# Patient Record
Sex: FEMALE | Race: WHITE | NOT HISPANIC OR LATINO | Employment: UNEMPLOYED | URBAN - METROPOLITAN AREA
[De-identification: names, ages, dates, MRNs, and addresses within clinical notes are randomized per-mention and may not be internally consistent; named-entity substitution may affect disease eponyms.]

---

## 2023-06-02 ENCOUNTER — TELEPHONE (OUTPATIENT)
Dept: OBSTETRICS AND GYNECOLOGY | Facility: CLINIC | Age: 23
End: 2023-06-02
Payer: COMMERCIAL

## 2023-06-02 DIAGNOSIS — Z32.00 POSSIBLE PREGNANCY, NOT CONFIRMED: Primary | ICD-10-CM

## 2023-06-02 NOTE — TELEPHONE ENCOUNTER
Patient coming in for NOB on July 7, Patient is unsure of her of last Menstrual. Needs orders to do HCG bloodwork.

## 2023-06-02 NOTE — TELEPHONE ENCOUNTER
Left patient a voicemail stating hcg order was placed. Informed pt she can come into the office any day of the week between 8:30 am and :30 pm to have this drawn without an appointment.

## 2023-06-07 ENCOUNTER — OFFICE VISIT (OUTPATIENT)
Dept: INTERNAL MEDICINE | Facility: CLINIC | Age: 23
End: 2023-06-07
Payer: COMMERCIAL

## 2023-06-07 ENCOUNTER — LAB (OUTPATIENT)
Dept: INTERNAL MEDICINE | Facility: CLINIC | Age: 23
End: 2023-06-07
Payer: COMMERCIAL

## 2023-06-07 DIAGNOSIS — Z34.90 PREGNANCY, UNSPECIFIED GESTATIONAL AGE: ICD-10-CM

## 2023-06-07 DIAGNOSIS — Z00.00 WELL ADULT EXAM: ICD-10-CM

## 2023-06-07 DIAGNOSIS — Z11.3 SCREEN FOR STD (SEXUALLY TRANSMITTED DISEASE): ICD-10-CM

## 2023-06-07 DIAGNOSIS — F39 MOOD DISORDER: Primary | ICD-10-CM

## 2023-06-07 LAB
ABO GROUP BLD: NORMAL
BACTERIA UR QL AUTO: ABNORMAL /HPF
BASOPHILS # BLD AUTO: 0.03 10*3/MM3 (ref 0–0.2)
BASOPHILS NFR BLD AUTO: 0.5 % (ref 0–1.5)
BILIRUB UR QL STRIP: NEGATIVE
CLARITY UR: ABNORMAL
COLOR UR: ABNORMAL
DEPRECATED RDW RBC AUTO: 42.4 FL (ref 37–54)
EOSINOPHIL # BLD AUTO: 0.25 10*3/MM3 (ref 0–0.4)
EOSINOPHIL NFR BLD AUTO: 4.2 % (ref 0.3–6.2)
ERYTHROCYTE [DISTWIDTH] IN BLOOD BY AUTOMATED COUNT: 14 % (ref 12.3–15.4)
GLUCOSE UR STRIP-MCNC: NEGATIVE MG/DL
HBA1C MFR BLD: 5.3 % (ref 4.8–5.6)
HCT VFR BLD AUTO: 37.6 % (ref 34–46.6)
HGB BLD-MCNC: 12.6 G/DL (ref 12–15.9)
HGB UR QL STRIP.AUTO: NEGATIVE
HYALINE CASTS UR QL AUTO: ABNORMAL /LPF
IMM GRANULOCYTES # BLD AUTO: 0.01 10*3/MM3 (ref 0–0.05)
IMM GRANULOCYTES NFR BLD AUTO: 0.2 % (ref 0–0.5)
KETONES UR QL STRIP: NEGATIVE
LEUKOCYTE ESTERASE UR QL STRIP.AUTO: ABNORMAL
LYMPHOCYTES # BLD AUTO: 2.35 10*3/MM3 (ref 0.7–3.1)
LYMPHOCYTES NFR BLD AUTO: 39.5 % (ref 19.6–45.3)
MCH RBC QN AUTO: 28.1 PG (ref 26.6–33)
MCHC RBC AUTO-ENTMCNC: 33.5 G/DL (ref 31.5–35.7)
MCV RBC AUTO: 83.9 FL (ref 79–97)
MONOCYTES # BLD AUTO: 0.45 10*3/MM3 (ref 0.1–0.9)
MONOCYTES NFR BLD AUTO: 7.6 % (ref 5–12)
NEUTROPHILS NFR BLD AUTO: 2.86 10*3/MM3 (ref 1.7–7)
NEUTROPHILS NFR BLD AUTO: 48 % (ref 42.7–76)
NITRITE UR QL STRIP: POSITIVE
NRBC BLD AUTO-RTO: 0 /100 WBC (ref 0–0.2)
PH UR STRIP.AUTO: 5.5 [PH] (ref 5–8)
PLATELET # BLD AUTO: 241 10*3/MM3 (ref 140–450)
PMV BLD AUTO: 11.8 FL (ref 6–12)
PROT UR QL STRIP: ABNORMAL
RBC # BLD AUTO: 4.48 10*6/MM3 (ref 3.77–5.28)
RBC # UR STRIP: ABNORMAL /HPF
REF LAB TEST METHOD: ABNORMAL
RH BLD: POSITIVE
SP GR UR STRIP: 1.02 (ref 1–1.03)
SQUAMOUS #/AREA URNS HPF: ABNORMAL /HPF
UROBILINOGEN UR QL STRIP: ABNORMAL
WBC # UR STRIP: ABNORMAL /HPF
WBC NRBC COR # BLD: 5.95 10*3/MM3 (ref 3.4–10.8)

## 2023-06-07 PROCEDURE — 84443 ASSAY THYROID STIM HORMONE: CPT | Performed by: STUDENT IN AN ORGANIZED HEALTH CARE EDUCATION/TRAINING PROGRAM

## 2023-06-07 PROCEDURE — 86592 SYPHILIS TEST NON-TREP QUAL: CPT | Performed by: STUDENT IN AN ORGANIZED HEALTH CARE EDUCATION/TRAINING PROGRAM

## 2023-06-07 PROCEDURE — G0432 EIA HIV-1/HIV-2 SCREEN: HCPCS | Performed by: STUDENT IN AN ORGANIZED HEALTH CARE EDUCATION/TRAINING PROGRAM

## 2023-06-07 PROCEDURE — 87491 CHLMYD TRACH DNA AMP PROBE: CPT | Performed by: STUDENT IN AN ORGANIZED HEALTH CARE EDUCATION/TRAINING PROGRAM

## 2023-06-07 PROCEDURE — 84702 CHORIONIC GONADOTROPIN TEST: CPT | Performed by: OBSTETRICS & GYNECOLOGY

## 2023-06-07 PROCEDURE — 86901 BLOOD TYPING SEROLOGIC RH(D): CPT | Performed by: STUDENT IN AN ORGANIZED HEALTH CARE EDUCATION/TRAINING PROGRAM

## 2023-06-07 PROCEDURE — 87591 N.GONORRHOEAE DNA AMP PROB: CPT | Performed by: STUDENT IN AN ORGANIZED HEALTH CARE EDUCATION/TRAINING PROGRAM

## 2023-06-07 PROCEDURE — 86900 BLOOD TYPING SEROLOGIC ABO: CPT | Performed by: STUDENT IN AN ORGANIZED HEALTH CARE EDUCATION/TRAINING PROGRAM

## 2023-06-07 PROCEDURE — 83036 HEMOGLOBIN GLYCOSYLATED A1C: CPT | Performed by: STUDENT IN AN ORGANIZED HEALTH CARE EDUCATION/TRAINING PROGRAM

## 2023-06-07 PROCEDURE — 86803 HEPATITIS C AB TEST: CPT | Performed by: STUDENT IN AN ORGANIZED HEALTH CARE EDUCATION/TRAINING PROGRAM

## 2023-06-07 PROCEDURE — 36415 COLL VENOUS BLD VENIPUNCTURE: CPT | Performed by: STUDENT IN AN ORGANIZED HEALTH CARE EDUCATION/TRAINING PROGRAM

## 2023-06-07 PROCEDURE — 80053 COMPREHEN METABOLIC PANEL: CPT | Performed by: STUDENT IN AN ORGANIZED HEALTH CARE EDUCATION/TRAINING PROGRAM

## 2023-06-07 PROCEDURE — 81001 URINALYSIS AUTO W/SCOPE: CPT | Performed by: STUDENT IN AN ORGANIZED HEALTH CARE EDUCATION/TRAINING PROGRAM

## 2023-06-07 PROCEDURE — 85025 COMPLETE CBC W/AUTO DIFF WBC: CPT | Performed by: STUDENT IN AN ORGANIZED HEALTH CARE EDUCATION/TRAINING PROGRAM

## 2023-06-07 RX ORDER — NALOXONE HYDROCHLORIDE 4 MG/.1ML
SPRAY NASAL
COMMUNITY
Start: 2023-04-27

## 2023-06-07 RX ORDER — HYDROXYZINE PAMOATE 50 MG/1
50 CAPSULE ORAL DAILY
COMMUNITY
Start: 2023-04-20

## 2023-06-07 RX ORDER — BUPRENORPHINE 300 MG/1
SOLUTION SUBCUTANEOUS
COMMUNITY
Start: 2023-06-02

## 2023-06-07 RX ORDER — ARIPIPRAZOLE 5 MG/1
5 TABLET ORAL DAILY
COMMUNITY
Start: 2023-05-15

## 2023-06-07 RX ORDER — LAMOTRIGINE 25 MG/1
25 TABLET ORAL DAILY
COMMUNITY
Start: 2023-05-10

## 2023-06-07 RX ORDER — CLONIDINE HYDROCHLORIDE 0.1 MG/1
0.1 TABLET ORAL DAILY
COMMUNITY
Start: 2023-05-10

## 2023-06-07 RX ORDER — ACETAMINOPHEN 325 MG/1
325 TABLET ORAL AS NEEDED
COMMUNITY
Start: 2023-03-27

## 2023-06-07 NOTE — PROGRESS NOTES
Office Note     Name: Ermias Young    : 2000     MRN: [id number]     Chief Complaint  Annual Exam (No cc, establish care,  just recently found out is pregnant ) and Anxiety (PHQ 6)    Subjective     History of Present Illness:  Ermias Young is a 22 y.o. female who presents today for      Recently found out she was pregnant. She has appointment with OB/gyn.     Currently at  Suboxone clinic at [facility], 2 months.    History bipolar disorder, followed by psych.    [facility]   Any morning       Review of Systems:   Review of Systems   All other systems reviewed and are negative.    Past Medical History:   Past Medical History:   Diagnosis Date    Anxiety     Depression        Past Surgical History:   Past Surgical History:   Procedure Laterality Date    CHOLECYSTECTOMY         Family History: History reviewed. No pertinent family history.    Social History:   Social History     Socioeconomic History    Marital status: Single   Tobacco Use    Smoking status: Every Day     Packs/day: 0.50     Years: 7.00     Pack years: 3.50     Types: Cigarettes    Smokeless tobacco: Never   Vaping Use    Vaping Use: Never used   Substance and Sexual Activity    Alcohol use: Not Currently    Drug use: Not Currently     Types: Methamphetamines, Heroin     Comment: quit 3/24       Immunizations:   Immunization History   Administered Date(s) Administered    DTaP 5 2003    DTaP, Unspecified 1999, 2000, 2000, 2000    FluLaval/Fluzone >6mos 2020    H1N1 All Forms 2009    HPV Quadrivalent 2014, 2014, 2015    Hep B / HiB 1999, 2000, 10/05/2000    IPV 1999, 2000, 10/05/2000, 2003    MMR 2000, 2003    Meningococcal Polysaccharide 2012    PEDS-Pneumococcal Conjugate (PCV7) 2000, 2001    Tdap 2012    Varicella 10/05/2000, 2014        Medications:     Current Outpatient Medications:      "acetaminophen (TYLENOL) 325 MG tablet, Take 1 tablet by mouth As Needed., Disp: , Rfl:     ARIPiprazole (ABILIFY) 5 MG tablet, Take 1 tablet by mouth Daily., Disp: , Rfl:     cloNIDine (CATAPRES) 0.1 MG tablet, Take 1 tablet by mouth Daily., Disp: , Rfl:     hydrOXYzine pamoate (VISTARIL) 50 MG capsule, Take 1 capsule by mouth Daily., Disp: , Rfl:     lamoTRIgine (LaMICtal) 25 MG tablet, Take 1 tablet by mouth Daily., Disp: , Rfl:     naloxone (NARCAN) 4 MG/0.1ML nasal spray, , Disp: , Rfl:     Sublocade 300 MG/1.5ML solution prefilled syringe, , Disp: , Rfl:     Allergies:   No Known Allergies    Objective     Vital Signs  /58   Pulse 94   Temp 93.2 °F (34 °C) (Temporal)   Ht 165.1 cm (65\")   Wt 88.5 kg (195 lb 3.2 oz)   SpO2 98%   BMI 32.48 kg/m²   Estimated body mass index is 32.48 kg/m² as calculated from the following:    Height as of this encounter: 165.1 cm (65\").    Weight as of this encounter: 88.5 kg (195 lb 3.2 oz).    [unfilled]    Physical Exam  Constitutional:       Appearance: Normal appearance.   Cardiovascular:      Rate and Rhythm: Normal rate and regular rhythm.      Pulses: Normal pulses.      Heart sounds: Normal heart sounds.   Pulmonary:      Effort: Pulmonary effort is normal.      Breath sounds: Normal breath sounds.   Neurological:      Mental Status: She is alert.        Result Review :                  Assessment and Plan     1. Mood disorder  Follows with psychiatry     2. Pregnancy, unspecified gestational age  Has ob/gyn appontment  - Urinalysis With Microscopic - Urine, Clean Catch; Future  - RPR, Rfx Qn RPR / Confirm TP; Future  - HIV-1/O/2 Ag/Ab w Reflex; Future  - Chlamydia trachomatis, Neisseria gonorrhoeae, PCR - Urine, Urine, Random Void; Future    3. Well adult exam    - CBC Auto Differential; Future  - TSH; Future  - Comprehensive Metabolic Panel; Future  - Hemoglobin A1c; Future  - HCV Antibody Rfx To Qnt PCR; Future  - ABO/Rh; Future  - Type & Screen; " Future  - Urinalysis With Microscopic - Urine, Clean Catch; Future  - RPR, Rfx Qn RPR / Confirm TP; Future  - HIV-1/O/2 Ag/Ab w Reflex; Future  - Chlamydia trachomatis, Neisseria gonorrhoeae, PCR - Urine, Urine, Random Void; Future    4. Screen for STD (sexually transmitted disease)    - RPR, Rfx Qn RPR / Confirm TP; Future  - HIV-1/O/2 Ag/Ab w Reflex; Future  - Chlamydia trachomatis, Neisseria gonorrhoeae, PCR - Urine, Urine, Random Void; Future       Follow Up  Return in about 1 year (around 6/7/2024) for Annual.    Med Lopez MD  [facility] [address]  [facility]  [address]  [address] [address] [phone]

## 2023-06-08 ENCOUNTER — TELEPHONE (OUTPATIENT)
Dept: OBSTETRICS AND GYNECOLOGY | Facility: CLINIC | Age: 23
End: 2023-06-08
Payer: COMMERCIAL

## 2023-06-08 ENCOUNTER — TELEPHONE (OUTPATIENT)
Dept: INTERNAL MEDICINE | Facility: CLINIC | Age: 23
End: 2023-06-08
Payer: COMMERCIAL

## 2023-06-08 LAB
ALBUMIN SERPL-MCNC: 4.1 G/DL (ref 3.5–5.2)
ALBUMIN/GLOB SERPL: 1.5 G/DL
ALP SERPL-CCNC: 56 U/L (ref 39–117)
ALT SERPL W P-5'-P-CCNC: 14 U/L (ref 1–33)
ANION GAP SERPL CALCULATED.3IONS-SCNC: 10 MMOL/L (ref 5–15)
AST SERPL-CCNC: 18 U/L (ref 1–32)
BILIRUB SERPL-MCNC: 0.5 MG/DL (ref 0–1.2)
BUN SERPL-MCNC: 7 MG/DL (ref 6–20)
BUN/CREAT SERPL: 13 (ref 7–25)
CALCIUM SPEC-SCNC: 8.6 MG/DL (ref 8.6–10.5)
CHLORIDE SERPL-SCNC: 105 MMOL/L (ref 98–107)
CO2 SERPL-SCNC: 22 MMOL/L (ref 22–29)
CREAT SERPL-MCNC: 0.54 MG/DL (ref 0.57–1)
EGFRCR SERPLBLD CKD-EPI 2021: 132.9 ML/MIN/1.73
GLOBULIN UR ELPH-MCNC: 2.8 GM/DL
GLUCOSE SERPL-MCNC: 65 MG/DL (ref 65–99)
HIV1+2 AB SER QL: NORMAL
POTASSIUM SERPL-SCNC: 3.9 MMOL/L (ref 3.5–5.2)
PROT SERPL-MCNC: 6.9 G/DL (ref 6–8.5)
RPR SER QL: NON REACTIVE
SODIUM SERPL-SCNC: 137 MMOL/L (ref 136–145)
TSH SERPL DL<=0.05 MIU/L-ACNC: 1.05 UIU/ML (ref 0.27–4.2)

## 2023-06-08 NOTE — TELEPHONE ENCOUNTER
Caller: Ermias Young    Relationship: Self    Best call back number: [phone]     What is the best time to reach you: ANYTIME    Who are you requesting to speak with (clinical staff, provider,  specific staff member): PCP/MA      What was the call regarding: PATIENT WAS IN OFFICE YESTERDAY AND FORGOT TO GET A NOTE. REQUESTING A CALL ONCE IT HAS BEEN DONE AND SHE WILL PICK IT UP    Is it okay if the provider responds through MyChart: CALLBACK

## 2023-06-09 LAB
C TRACH RRNA SPEC QL NAA+PROBE: NEGATIVE
N GONORRHOEA RRNA SPEC QL NAA+PROBE: NEGATIVE

## 2023-06-09 NOTE — TELEPHONE ENCOUNTER
Pt gave VU of test results and states she is unable to come in today to repeat her HCG but will come in first thing Monday morning.

## 2023-06-09 NOTE — TELEPHONE ENCOUNTER
PT CALLING TO GO OVER TEST RESULTS AND THERE IS A NOTE FROM [facility] THAT SHE NEEDS TO COME IN AND REPEAT HCG TODAY

## 2023-06-11 VITALS
DIASTOLIC BLOOD PRESSURE: 58 MMHG | OXYGEN SATURATION: 98 % | HEIGHT: 65 IN | WEIGHT: 195.2 LBS | TEMPERATURE: 93.2 F | BODY MASS INDEX: 32.52 KG/M2 | HEART RATE: 94 BPM | SYSTOLIC BLOOD PRESSURE: 116 MMHG

## 2023-06-12 LAB
DIAGNOSTIC IMP SPEC-IMP: NORMAL
HCV IGG SERPL QL IA: REACTIVE
HCV RNA SERPL NAA+PROBE-ACNC: NORMAL IU/ML
REF LAB TEST REF RANGE: NORMAL

## 2023-06-13 NOTE — TELEPHONE ENCOUNTER
CALLED PT TO INFORM LETTER WAS WRITTEN AND SHE CAN   AND LFT VM WITH CB NUMBER   OK FOR HUB TO RELAY MESSAGE

## 2023-06-15 DIAGNOSIS — N39.0 URINARY TRACT INFECTION WITHOUT HEMATURIA, SITE UNSPECIFIED: Primary | ICD-10-CM

## 2023-06-15 RX ORDER — NITROFURANTOIN 25; 75 MG/1; MG/1
100 CAPSULE ORAL 2 TIMES DAILY
Qty: 10 CAPSULE | Refills: 0 | Status: SHIPPED | OUTPATIENT
Start: 2023-06-15 | End: 2023-06-20

## 2023-06-16 ENCOUNTER — TELEPHONE (OUTPATIENT)
Dept: INTERNAL MEDICINE | Facility: CLINIC | Age: 23
End: 2023-06-16
Payer: COMMERCIAL

## 2023-06-16 NOTE — TELEPHONE ENCOUNTER
----- Message from Med Lopez MD sent at 6/15/2023  7:46 PM EDT -----  Reviewed patients blood work    Her Hep C antibody was positive, however the check for active viral Hep C was negative, this means the patient in the past was infected with Hep C, but currently there is no active infection.     Her STD testing was negatve, her thyroid results were negative, blood sugar test were within normal limits    Her UA did have signs of urinary tract infection. I am going to prescribe a 5 day course antbiotic that is safe in pregnancy that she should take.

## 2023-06-16 NOTE — TELEPHONE ENCOUNTER
--Pn of lab results, pt expressed understanding     --- Message from Med Lopez MD sent at 6/15/2023  7:48 PM EDT -----  Please let patient know, the urine test she did show underlying UTI of the bladder. I sent an antibiotic that is safe in pregnancy. She will take this for 5 days.

## 2023-07-07 PROBLEM — F11.20 UNCOMPLICATED OPIOID DEPENDENCE: Status: ACTIVE | Noted: 2023-07-07

## 2023-07-07 PROBLEM — O09.90 SUPERVISION OF HIGH RISK PREGNANCY, ANTEPARTUM: Status: ACTIVE | Noted: 2023-07-07

## 2023-08-08 ENCOUNTER — ROUTINE PRENATAL (OUTPATIENT)
Dept: OBSTETRICS AND GYNECOLOGY | Facility: CLINIC | Age: 23
End: 2023-08-08
Payer: COMMERCIAL

## 2023-08-08 VITALS — BODY MASS INDEX: 31.62 KG/M2 | SYSTOLIC BLOOD PRESSURE: 108 MMHG | WEIGHT: 190 LBS | DIASTOLIC BLOOD PRESSURE: 62 MMHG

## 2023-08-08 DIAGNOSIS — R76.8 HEPATITIS C ANTIBODY POSITIVE IN BLOOD: ICD-10-CM

## 2023-08-08 DIAGNOSIS — F17.290 VAPING NICOTINE DEPENDENCE, TOBACCO PRODUCT: ICD-10-CM

## 2023-08-08 DIAGNOSIS — O09.90 SUPERVISION OF HIGH RISK PREGNANCY, ANTEPARTUM: ICD-10-CM

## 2023-08-08 DIAGNOSIS — F11.20 UNCOMPLICATED OPIOID DEPENDENCE: ICD-10-CM

## 2023-08-08 DIAGNOSIS — R12 HEARTBURN: ICD-10-CM

## 2023-08-08 DIAGNOSIS — Z34.91 PRENATAL CARE IN FIRST TRIMESTER: Primary | ICD-10-CM

## 2023-08-08 PROBLEM — F15.21 METHAMPHETAMINE USE DISORDER, MODERATE, IN EARLY REMISSION: Chronic | Status: ACTIVE | Noted: 2023-06-19

## 2023-08-08 PROBLEM — F11.21: Chronic | Status: ACTIVE | Noted: 2023-06-19

## 2023-08-08 LAB
EXPIRATION DATE: NORMAL
GLUCOSE UR STRIP-MCNC: NEGATIVE MG/DL
Lab: NORMAL
PROT UR STRIP-MCNC: NEGATIVE MG/DL

## 2023-08-08 NOTE — PROGRESS NOTES
OB FOLLOW UP  CC- Here for care of pregnancy        Ermias Young is a 22 y.o.  13w0d patient being seen today for her obstetrical follow up visit. Patient reports continued nausea and vomiting, although it has improved.     New ob labs reviewed- +Hep C. Patient reports that she did not know about any previous +Hep C testing.     Her prenatal care is complicated by (and status) :  History of drug abuse, HEP C positive  Patient Active Problem List   Diagnosis    Uncomplicated opioid dependence    Supervision of high risk pregnancy, antepartum    Methamphetamine use disorder, moderate, in early remission    Moderate opioid use disorder, in early remission    Vaping nicotine dependence, tobacco product    Hepatitis C antibody positive in blood       Desires genetic testing?: No    Ultrasound Today: No    ROS -   Patient Reports :  nausea and vomiting  Patient Denies: Vaginal Spotting and cramping  Fetal Movement :  absent- too early  All other systems reviewed and are negative.     The additional following portions of the patient's history were reviewed and updated as appropriate: allergies, current medications, past family history, past medical history, past social history, past surgical history, and problem list.    I have reviewed and agree with the HPI, ROS, and historical information as entered above. Evelyn Charlton, APRN          /62   Wt 86.2 kg (190 lb)   LMP  (LMP Unknown)   BMI 31.62 kg/mý         EXAM:     Prenatal Vitals  BP: 108/62  Weight: 86.2 kg (190 lb)   Fetal Heart Rate: 165          Urine Glucose Read-only: Negative  Urine Protein Read-only: Negative       Assessment and Plan    Problem List Items Addressed This Visit          Gastrointestinal Abdominal     Hepatitis C antibody positive in blood    Relevant Orders    Hepatitis C RNA, Quantitative, PCR (graph)       Gravid and     Supervision of high risk pregnancy, antepartum       Mental Health    Uncomplicated opioid  dependence    Current Assessment & Plan     UDS on Initial ob negative  HEP C +  Rubella Non immune            Other    Vaping nicotine dependence, tobacco product    Overview     Last Assessment & Plan: Formatting of this note might be different from the original. Condition: stable Follow up in: three months          Other Visit Diagnoses       Prenatal care in first trimester    -  Primary    Relevant Orders    POC Protein, Urine, Qualitative, Dipstick (Completed)    POC Glucose, Urine, Qualitative, Dipstick (Completed)    Heartburn                Pregnancy at 13w0d  Labs reviewed from New OB Visit.  Counseled on genetic testing, carrier status and option for NT screen  Activity and Exercise discussed.  Lab(s) Ordered  Nausea/Vomiting - she does not desire medications at this time.  Discussed conservative ways to help with nausea.  Patient is on Prenatal vitamins  Hepatitis C viral load checked today.  Heartburn-ok to take tums PRN  Follow up in four weeks.    Evelyn Charlton, APRN  08/08/2023

## 2023-10-12 ENCOUNTER — REFERRAL TRIAGE (OUTPATIENT)
Dept: LABOR AND DELIVERY | Facility: HOSPITAL | Age: 23
End: 2023-10-12
Payer: COMMERCIAL